# Patient Record
Sex: MALE | Race: WHITE | NOT HISPANIC OR LATINO | Employment: FULL TIME | URBAN - METROPOLITAN AREA
[De-identification: names, ages, dates, MRNs, and addresses within clinical notes are randomized per-mention and may not be internally consistent; named-entity substitution may affect disease eponyms.]

---

## 2021-05-27 ENCOUNTER — RECORDS - HEALTHEAST (OUTPATIENT)
Dept: ADMINISTRATIVE | Facility: CLINIC | Age: 56
End: 2021-05-27

## 2021-06-15 PROBLEM — I48.91 ATRIAL FIBRILLATION STATUS POST CARDIOVERSION (H): Status: ACTIVE | Noted: 2017-05-02

## 2021-06-15 PROBLEM — I48.0 PAROXYSMAL ATRIAL FIBRILLATION (H): Status: ACTIVE | Noted: 2017-05-02

## 2024-11-12 ENCOUNTER — OFFICE VISIT (OUTPATIENT)
Dept: URGENT CARE | Facility: URGENT CARE | Age: 59
End: 2024-11-12
Payer: COMMERCIAL

## 2024-11-12 VITALS
RESPIRATION RATE: 20 BRPM | TEMPERATURE: 97.8 F | HEART RATE: 89 BPM | OXYGEN SATURATION: 98 % | SYSTOLIC BLOOD PRESSURE: 127 MMHG | DIASTOLIC BLOOD PRESSURE: 72 MMHG
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
## 2024-11-12 PROCEDURE — 99203 OFFICE O/P NEW LOW 30 MIN: CPT

## 2024-11-12 ASSESSMENT — ENCOUNTER SYMPTOMS
FATIGUE: 0
SORE THROAT: 0
VOMITING: 0
WEAKNESS: 0
FEVER: 0
DIARRHEA: 0
HEADACHES: 0
NAUSEA: 0
ABDOMINAL PAIN: 0
CHEST TIGHTNESS: 0
BRUISES/BLEEDS EASILY: 0
DIZZINESS: 0
CHILLS: 0
NECK PAIN: 0
SHORTNESS OF BREATH: 0

## 2024-11-13 NOTE — PROGRESS NOTES
Patient presents with:  Urgent Care: Pt  tripped on gate today 3 hours ago   Hit L side of the face eye pain/jaw pain and   R knee / R wrist       Clinical Decision Making:      ICD-10-CM    1. Fall, initial encounter  W19.XXXA         Patient well-appearing and vitally stable today in clinic. No headaches, visual changes, dizziness, loss of concentration, nausea/vomiting, fatigue, or light sensitivity that might indicate concussion. Patient not on blood thinners. Wound below eyebrow is scabbed over and does not show signs of infection or need for stitches. No visual changes. No indication for further imaging at this time. Less concern for fracture to right wrist and right knee given full ROM and pain has improved to these areas since injury and do not have any open sores, skin discoloration or swelling. Patient instructions as below. Discussed red flag symptoms for when to return.       Patient Instructions   No indication for xray or stitches today. Your wound likes like it's scabbed over so less likely to be exposed to infection. You will likely having some bruising and swelling the area. You can take tylenol or ibuprofen as needed for pain.  If at any point you develop new headaches, visual changes, lightheaded/dizziness, worsening wrist or knee pain, or worsening symptoms return for further evaluation.     HPI:  Manjeet Fuller is a 58 year old male who presents today with concerns of tripping on fence about 3-4 hrs ago. Hit the left side of his face, right knee, and right wrist. No loss of consciousness. Had a mild headache right after injury but that has now resolved. No on any blood thinners. No nausea, vomiting, visual changes, lightheadedness/dizziness or light sensitivity.     History obtained from the patient.    Problem List:  2017-05: Paroxysmal atrial fibrillation (H)  2017-05: Atrial fibrillation status post cardioversion (H)      No past medical history on file.    Social History     Tobacco Use     Smoking status: Not on file    Smokeless tobacco: Not on file   Substance Use Topics    Alcohol use: Not on file         Review of Systems   Constitutional:  Negative for chills, fatigue and fever.   HENT:  Negative for ear pain and sore throat.    Respiratory:  Negative for chest tightness and shortness of breath.    Cardiovascular:  Negative for chest pain.   Gastrointestinal:  Negative for abdominal pain, diarrhea, nausea and vomiting.   Musculoskeletal:  Negative for neck pain.   Neurological:  Negative for dizziness, syncope, weakness and headaches.   Hematological:  Does not bruise/bleed easily.       Vitals:    11/12/24 1829   BP: 127/72   Pulse: 89   Resp: 20   Temp: 97.8  F (36.6  C)   SpO2: 98%       Physical Exam  Constitutional:       General: He is not in acute distress.     Appearance: He is not diaphoretic.   HENT:      Head: Normocephalic and atraumatic.      Right Ear: Tympanic membrane and external ear normal.      Left Ear: Tympanic membrane and external ear normal.      Mouth/Throat:      Pharynx: No oropharyngeal exudate or posterior oropharyngeal erythema.   Eyes:      General:         Right eye: No discharge.         Left eye: No discharge.      Extraocular Movements: Extraocular movements intact.      Conjunctiva/sclera: Conjunctivae normal.      Pupils: Pupils are equal, round, and reactive to light.   Cardiovascular:      Rate and Rhythm: Normal rate and regular rhythm.   Pulmonary:      Effort: Pulmonary effort is normal. No respiratory distress.   Musculoskeletal:         General: No swelling or tenderness. Normal range of motion.      Cervical back: No tenderness.   Skin:     General: Skin is warm.      Capillary Refill: Capillary refill takes less than 2 seconds.      Findings: No erythema.      Comments: 0.25-0.5cm scratch directly below left eyebrow that is scabbed over, no active bleeding. Some surrounding swelling to area. No open sores, skin discoloration or swelling to right knee  and right wrist. Full ROM of knee and wrist, sensation intact.    Neurological:      General: No focal deficit present.      Mental Status: He is alert.      Cranial Nerves: No cranial nerve deficit.      Sensory: No sensory deficit.      Coordination: Coordination normal.      Gait: Gait normal.   Psychiatric:         Mood and Affect: Mood normal.         Thought Content: Thought content normal.         Judgment: Judgment normal.           At the end of the encounter, I discussed results, diagnosis, medications. Discussed red flags for immediate return to clinic/ER, as well as indications for follow up if no improvement. Patient understood and agreed to plan. Patient was stable for discharge.    ESTRELLA [facility]

## 2024-11-13 NOTE — PATIENT INSTRUCTIONS
No indication for xray or stitches today. Your wound likes like it's scabbed over so less likely to be exposed to infection. You will likely having some bruising and swelling the area. You can take tylenol or ibuprofen as needed for pain.  If at any point you develop new headaches, visual changes, lightheaded/dizziness, worsening wrist or knee pain, or worsening symptoms return for further evaluation.